# Patient Record
Sex: FEMALE | Race: WHITE | Employment: PART TIME | URBAN - METROPOLITAN AREA
[De-identification: names, ages, dates, MRNs, and addresses within clinical notes are randomized per-mention and may not be internally consistent; named-entity substitution may affect disease eponyms.]

---

## 2017-05-17 ENCOUNTER — DOCUMENTATION ONLY (OUTPATIENT)
Dept: SURGERY | Age: 27
End: 2017-05-17

## 2017-05-17 NOTE — PROGRESS NOTES
PATIENT'S MOTHER CALLED TO  RECORDS AND IMAGING SINCE PATIENT IS GETTING SECOND OPINION.  SHE IS LISTED ON HIPPA AND SHOWED ID SO I PRINTED REPORTS AND GOT IMAGING FROM ULTRASOUND ROOM TO GIVE TO THE PATIENT'S MOTHER 5-17-17 LS

## 2017-12-18 ENCOUNTER — HOSPITAL ENCOUNTER (OUTPATIENT)
Dept: MRI IMAGING | Age: 27
Discharge: HOME | End: 2017-12-18
Attending: OPHTHALMOLOGY
Payer: COMMERCIAL

## 2017-12-18 VITALS — WEIGHT: 135 LBS

## 2017-12-18 DIAGNOSIS — C79.9 SECONDARY MALIGNANT NEOPLASM (HCC): ICD-10-CM

## 2017-12-18 PROCEDURE — 74011250636 HC RX REV CODE- 250/636: Performed by: OPHTHALMOLOGY

## 2017-12-18 PROCEDURE — 70553 MRI BRAIN STEM W/O & W/DYE: CPT

## 2017-12-18 PROCEDURE — A9576 INJ PROHANCE MULTIPACK: HCPCS | Performed by: OPHTHALMOLOGY

## 2017-12-18 RX ADMIN — GADOTERIDOL 12 ML: 279.3 INJECTION, SOLUTION INTRAVENOUS at 18:45
